# Patient Record
Sex: FEMALE | Race: BLACK OR AFRICAN AMERICAN | Employment: FULL TIME | URBAN - METROPOLITAN AREA
[De-identification: names, ages, dates, MRNs, and addresses within clinical notes are randomized per-mention and may not be internally consistent; named-entity substitution may affect disease eponyms.]

---

## 2021-05-12 ENCOUNTER — HOSPITAL ENCOUNTER (EMERGENCY)
Age: 26
Discharge: HOME | End: 2021-05-12
Attending: EMERGENCY MEDICINE

## 2021-05-12 VITALS
BODY MASS INDEX: 40.75 KG/M2 | RESPIRATION RATE: 18 BRPM | HEIGHT: 63 IN | SYSTOLIC BLOOD PRESSURE: 175 MMHG | DIASTOLIC BLOOD PRESSURE: 103 MMHG | OXYGEN SATURATION: 99 % | TEMPERATURE: 98.1 F | WEIGHT: 230 LBS | HEART RATE: 91 BPM

## 2021-05-12 DIAGNOSIS — L03.012 PARONYCHIA OF FINGER OF LEFT HAND: Primary | ICD-10-CM

## 2021-05-12 PROCEDURE — 99282 EMERGENCY DEPT VISIT SF MDM: CPT

## 2021-05-12 PROCEDURE — 75810000289 HC I&D ABSCESS SIMP/COMP/MULT

## 2021-05-12 NOTE — ED NOTES
I have reviewed discharge instructions with the patient. The patient verbalized understanding. Patient left ED via Discharge Method: ambulatory to Home with self. Opportunity for questions and clarification provided. Patient given 0 scripts. Pt in no acute distress at discharge      To continue your aftercare when you leave the hospital, you may receive an automated call from our care team to check in on how you are doing. This is a free service and part of our promise to provide the best care and service to meet your aftercare needs.  If you have questions, or wish to unsubscribe from this service please call [phone]. Thank you for Choosing our [facility] Emergency Department.

## 2021-05-12 NOTE — ED PROVIDER NOTES
The history is provided by the patient. Finger Pain   This is a new problem. The current episode started 2 days ago. The problem has been gradually worsening. The pain is present in the left fingers. The quality of the pain is described as aching. The pain is at a severity of 3/10. The pain is moderate. Pertinent negatives include no numbness, full range of motion, no stiffness, no tingling, no itching, no back pain and no neck pain. The symptoms are aggravated by movement and contact. She has tried nothing for the symptoms. The treatment provided no relief. There has been no history of extremity trauma. History reviewed. No pertinent past medical history. No past surgical history on file. History reviewed. No pertinent family history.     Social History     Socioeconomic History    Marital status: SINGLE     Spouse name: Not on file    Number of children: Not on file    Years of education: Not on file    Highest education level: Not on file   Occupational History    Not on file   Social Needs    Financial resource strain: Not on file    Food insecurity     Worry: Not on file     Inability: Not on file    Transportation needs     Medical: Not on file     Non-medical: Not on file   Tobacco Use    Smoking status: Never Smoker   Substance and Sexual Activity    Alcohol use: No    Drug use: No    Sexual activity: Not on file   Lifestyle    Physical activity     Days per week: Not on file     Minutes per session: Not on file    Stress: Not on file   Relationships    Social connections     Talks on phone: Not on file     Gets together: Not on file     Attends [employer] service: Not on file     Active member of club or organization: Not on file     Attends meetings of clubs or organizations: Not on file     Relationship status: Not on file    Intimate partner violence     Fear of current or ex partner: Not on file     Emotionally abused: Not on file     Physically abused: Not on file Forced sexual activity: Not on file   Other Topics Concern    Not on file   Social History Narrative    Not on file         ALLERGIES: Patient has no known allergies. Review of Systems   Musculoskeletal: Positive for arthralgias and joint swelling. Negative for back pain, neck pain and stiffness. Skin: Negative for itching. Neurological: Negative for tingling and numbness. All other systems reviewed and are negative. Vitals:    05/12/21 0202   BP: (!) 175/103   Pulse: 91   Resp: 18   Temp: 98.1 °F (36.7 °C)   SpO2: 99%   Weight: 104.3 kg (230 lb)   Height: 5' 3\" (1.6 m)            Physical Exam  Vitals signs and nursing note reviewed. Constitutional:       Appearance: She is well-developed. HENT:      Head: Normocephalic and atraumatic. Eyes:      Conjunctiva/sclera: Conjunctivae normal.      Pupils: Pupils are equal, round, and reactive to light. Neck:      Musculoskeletal: Normal range of motion and neck supple. Pulmonary:      Effort: Pulmonary effort is normal.   Musculoskeletal: Normal range of motion. General: Tenderness present. Comments: Tender, fluctuant area along the cuticle of the left index finger   Skin:     General: Skin is warm and dry. Neurological:      Mental Status: She is alert and oriented to person, place, and time. MDM  Number of Diagnoses or Management Options  Diagnosis management comments: Finger exam consistent with paronychia. Drained at bedside. Counseled patient on warm water soaks    Risk of Complications, Morbidity, and/or Mortality  Presenting problems: moderate  Diagnostic procedures: minimal  Management options: moderate  General comments: I personally reviewed the patient's vital signs, laboratory tests, and/or radiological findings. I discussed these findings with the patient and their significance. I answered all questions and gave the patient clear return precautions.   The patient was discharged from the emergency department in stable condition        Patient Progress  Patient progress: improved         I&D Divina Simple    Date/Time: 5/12/2021 2:11 AM  Performed by: Maurice Arana MD  Authorized by: Maurice Arana MD     Consent:     Consent obtained:  Verbal    Consent given by:  Patient    Risks discussed:  Bleeding and damage to other organs    Alternatives discussed:  No treatment  Location:     Indications for incision and drainage: paronychia. Size:  1    Location:  Upper extremity    Upper extremity location:  Finger    Finger location:  L index finger  Pre-procedure details:     Skin preparation:  Antiseptic wash  Anesthesia (see MAR for exact dosages): Anesthesia method:  None  Procedure type:     Complexity:  Simple  Procedure details:     Needle aspiration: yes      Needle size:  18 G    Incision depth:  Dermal    Drainage:  Purulent    Drainage amount:  Scant    Wound treatment:  Wound left open    Packing materials:  None  Post-procedure details:     Patient tolerance of procedure:   Tolerated well, no immediate complications

## 2021-05-12 NOTE — ED TRIAGE NOTES
Left pointer finger pain and swelling that began on Friday. Pt states that there was some drainage coming from the finger.